# Patient Record
Sex: FEMALE | Race: ASIAN | NOT HISPANIC OR LATINO | URBAN - METROPOLITAN AREA
[De-identification: names, ages, dates, MRNs, and addresses within clinical notes are randomized per-mention and may not be internally consistent; named-entity substitution may affect disease eponyms.]

---

## 2017-12-06 ENCOUNTER — OUTPATIENT (OUTPATIENT)
Dept: OUTPATIENT SERVICES | Facility: HOSPITAL | Age: 52
LOS: 1 days | End: 2017-12-06
Payer: COMMERCIAL

## 2017-12-06 VITALS
SYSTOLIC BLOOD PRESSURE: 130 MMHG | HEIGHT: 63 IN | RESPIRATION RATE: 14 BRPM | DIASTOLIC BLOOD PRESSURE: 92 MMHG | TEMPERATURE: 99 F | HEART RATE: 69 BPM | WEIGHT: 110.01 LBS

## 2017-12-06 DIAGNOSIS — Z01.818 ENCOUNTER FOR OTHER PREPROCEDURAL EXAMINATION: ICD-10-CM

## 2017-12-06 DIAGNOSIS — R87.619 UNSPECIFIED ABNORMAL CYTOLOGICAL FINDINGS IN SPECIMENS FROM CERVIX UTERI: ICD-10-CM

## 2017-12-06 DIAGNOSIS — R87.69 ABNORMAL CYTOLOGICAL FINDINGS IN SPECIMENS FROM OTHER FEMALE GENITAL ORGANS: ICD-10-CM

## 2017-12-06 LAB
HCG UR QL: NEGATIVE — SIGNIFICANT CHANGE UP
HCT VFR BLD CALC: 47.6 % — HIGH (ref 34.5–45)
HGB BLD-MCNC: 15.1 G/DL — SIGNIFICANT CHANGE UP (ref 11.5–15.5)
MCHC RBC-ENTMCNC: 28.7 PG — SIGNIFICANT CHANGE UP (ref 27–34)
MCHC RBC-ENTMCNC: 31.7 GM/DL — LOW (ref 32–36)
MCV RBC AUTO: 90.4 FL — SIGNIFICANT CHANGE UP (ref 80–100)
PLATELET # BLD AUTO: 206 K/UL — SIGNIFICANT CHANGE UP (ref 150–400)
RBC # BLD: 5.26 M/UL — HIGH (ref 3.8–5.2)
RBC # FLD: 12 % — SIGNIFICANT CHANGE UP (ref 10.3–14.5)
WBC # BLD: 3.5 K/UL — LOW (ref 3.8–10.5)
WBC # FLD AUTO: 3.5 K/UL — LOW (ref 3.8–10.5)

## 2017-12-06 PROCEDURE — G0463: CPT

## 2017-12-06 PROCEDURE — 85027 COMPLETE CBC AUTOMATED: CPT

## 2017-12-06 PROCEDURE — 86850 RBC ANTIBODY SCREEN: CPT

## 2017-12-06 PROCEDURE — 86900 BLOOD TYPING SEROLOGIC ABO: CPT

## 2017-12-06 PROCEDURE — 81025 URINE PREGNANCY TEST: CPT

## 2017-12-06 PROCEDURE — 86901 BLOOD TYPING SEROLOGIC RH(D): CPT

## 2017-12-06 NOTE — H&P PST ADULT - ASSESSMENT
52 year old female with Unspecified Abnormal Cytological Findings in Specimens from Cervix and Uteri  - scheduled for Dilation & Curettage  Hysteroscopy with Dr Maurice Ingram on 12/13/17 -

## 2017-12-06 NOTE — H&P PST ADULT - NSANTHOSAYNRD_GEN_A_CORE
No. SAW screening performed.  STOP BANG Legend: 0-2 = LOW Risk; 3-4 = INTERMEDIATE Risk; 5-8 = HIGH Risk

## 2017-12-06 NOTE — H&P PST ADULT - PMH
Abnormal cytological findings in specimens from other female genital organs  Unspecified Abnormal Cytological Findings in Specimens from cervix Uteri

## 2017-12-06 NOTE — H&P PST ADULT - RS GEN PE MLT RESP DETAILS PC
respirations non-labored/breath sounds equal/airway patent/clear to auscultation bilaterally/good air movement

## 2017-12-06 NOTE — H&P PST ADULT - PROBLEM SELECTOR PLAN 1
PST Labs; CBC, Type & Screen, Ucg - No Medical Clearance Needed - pre-op instructions given to pt with understanding verbalized  - Care Notes on Hysteroscopy given to pt for review -

## 2017-12-06 NOTE — H&P PST ADULT - HISTORY OF PRESENT ILLNESS
52 year old female  presents for PST prior to Dilation & Curettage Hysteroscopy with Dr Maurice Ingram on 17 - Pt notes "my pap smear results show something abnormal its been years actually and I get them every 6 months - this time Dr Ingram says its different so he wants to do this procedure." Denies menses for past 2 years - denies any abdominal cramping or other menstrual symptoms - following discussions with Dr Ingram pt is electing for scheduled procedure.

## 2017-12-12 ENCOUNTER — TRANSCRIPTION ENCOUNTER (OUTPATIENT)
Age: 52
End: 2017-12-12

## 2017-12-12 RX ORDER — SODIUM CHLORIDE 9 MG/ML
1000 INJECTION, SOLUTION INTRAVENOUS
Qty: 0 | Refills: 0 | Status: DISCONTINUED | OUTPATIENT
Start: 2017-12-13 | End: 2017-12-13

## 2017-12-12 NOTE — H&P PST ADULT - PMH
Abnormal cytological findings in specimens from other female genital organs  Unspecified Abnormal Cytological Findings in Specimens from cervix Uteri  Atypical glandular cells on cervical Pap smear    Hypercholesterolemia

## 2017-12-12 NOTE — H&P PST ADULT - HISTORY OF PRESENT ILLNESS
51 yo OF, , LMP-   , with atypical glandular cells on a recent pap smear. An endometrial sampling was attempted in the office but was unsuccessful due to cervical stenosis and pt discomfort with dilation. A recent colposcopy was wnl.

## 2017-12-13 ENCOUNTER — OUTPATIENT (OUTPATIENT)
Dept: OUTPATIENT SERVICES | Facility: HOSPITAL | Age: 52
LOS: 1 days | End: 2017-12-13
Payer: COMMERCIAL

## 2017-12-13 ENCOUNTER — RESULT REVIEW (OUTPATIENT)
Age: 52
End: 2017-12-13

## 2017-12-13 VITALS
HEART RATE: 60 BPM | RESPIRATION RATE: 14 BRPM | OXYGEN SATURATION: 98 % | HEIGHT: 63 IN | DIASTOLIC BLOOD PRESSURE: 78 MMHG | TEMPERATURE: 98 F | SYSTOLIC BLOOD PRESSURE: 116 MMHG | WEIGHT: 110.01 LBS

## 2017-12-13 VITALS
OXYGEN SATURATION: 99 % | RESPIRATION RATE: 16 BRPM | DIASTOLIC BLOOD PRESSURE: 74 MMHG | SYSTOLIC BLOOD PRESSURE: 120 MMHG | HEART RATE: 65 BPM

## 2017-12-13 DIAGNOSIS — R87.619 UNSPECIFIED ABNORMAL CYTOLOGICAL FINDINGS IN SPECIMENS FROM CERVIX UTERI: ICD-10-CM

## 2017-12-13 DIAGNOSIS — Z98.890 OTHER SPECIFIED POSTPROCEDURAL STATES: Chronic | ICD-10-CM

## 2017-12-13 PROCEDURE — 88305 TISSUE EXAM BY PATHOLOGIST: CPT

## 2017-12-13 PROCEDURE — 88305 TISSUE EXAM BY PATHOLOGIST: CPT | Mod: 26

## 2017-12-13 PROCEDURE — 58558 HYSTEROSCOPY BIOPSY: CPT

## 2017-12-13 RX ORDER — SODIUM CHLORIDE 9 MG/ML
1000 INJECTION, SOLUTION INTRAVENOUS
Qty: 0 | Refills: 0 | Status: DISCONTINUED | OUTPATIENT
Start: 2017-12-13 | End: 2017-12-13

## 2017-12-13 RX ORDER — METOCLOPRAMIDE HCL 10 MG
10 TABLET ORAL ONCE
Qty: 0 | Refills: 0 | Status: DISCONTINUED | OUTPATIENT
Start: 2017-12-13 | End: 2017-12-13

## 2017-12-13 RX ORDER — OXYCODONE AND ACETAMINOPHEN 5; 325 MG/1; MG/1
1 TABLET ORAL EVERY 4 HOURS
Qty: 0 | Refills: 0 | Status: DISCONTINUED | OUTPATIENT
Start: 2017-12-13 | End: 2017-12-13

## 2017-12-13 RX ORDER — MEPERIDINE HYDROCHLORIDE 50 MG/ML
12.5 INJECTION INTRAMUSCULAR; INTRAVENOUS; SUBCUTANEOUS
Qty: 0 | Refills: 0 | Status: DISCONTINUED | OUTPATIENT
Start: 2017-12-13 | End: 2017-12-13

## 2017-12-13 RX ORDER — ONDANSETRON 8 MG/1
4 TABLET, FILM COATED ORAL ONCE
Qty: 0 | Refills: 0 | Status: DISCONTINUED | OUTPATIENT
Start: 2017-12-13 | End: 2017-12-13

## 2017-12-13 RX ORDER — DIPHENHYDRAMINE HCL 50 MG
12.5 CAPSULE ORAL ONCE
Qty: 0 | Refills: 0 | Status: COMPLETED | OUTPATIENT
Start: 2017-12-13 | End: 2017-12-13

## 2017-12-13 RX ORDER — HYDROMORPHONE HYDROCHLORIDE 2 MG/ML
0.5 INJECTION INTRAMUSCULAR; INTRAVENOUS; SUBCUTANEOUS
Qty: 0 | Refills: 0 | Status: DISCONTINUED | OUTPATIENT
Start: 2017-12-13 | End: 2017-12-13

## 2017-12-13 RX ADMIN — Medication 12.5 MILLIGRAM(S): at 10:15

## 2017-12-13 RX ADMIN — SODIUM CHLORIDE 75 MILLILITER(S): 9 INJECTION, SOLUTION INTRAVENOUS at 08:35

## 2017-12-13 RX ADMIN — SODIUM CHLORIDE 75 MILLILITER(S): 9 INJECTION, SOLUTION INTRAVENOUS at 06:42

## 2017-12-13 NOTE — ASU DISCHARGE PLAN (ADULT/PEDIATRIC). - NOTIFY
Bleeding that does not stop/Pain not relieved by Medications/GYN Fever>100.4/Inability to Tolerate Liquids or Foods

## 2017-12-13 NOTE — BRIEF OPERATIVE NOTE - PROCEDURE
<<-----Click on this checkbox to enter Procedure Hysteroscopy with dilation and curettage of uterus  12/13/2017    Active  [facility]

## 2017-12-14 LAB — SURGICAL PATHOLOGY FINAL REPORT - CH: SIGNIFICANT CHANGE UP

## 2019-10-01 PROBLEM — R87.69 ABNORMAL CYTOLOGICAL FINDINGS IN SPECIMENS FROM OTHER FEMALE GENITAL ORGANS: Chronic | Status: ACTIVE | Noted: 2017-12-06

## 2019-10-01 PROBLEM — E78.00 PURE HYPERCHOLESTEROLEMIA, UNSPECIFIED: Chronic | Status: ACTIVE | Noted: 2017-12-12

## 2019-10-01 PROBLEM — R87.619 UNSPECIFIED ABNORMAL CYTOLOGICAL FINDINGS IN SPECIMENS FROM CERVIX UTERI: Chronic | Status: ACTIVE | Noted: 2017-12-12

## 2019-10-07 PROBLEM — Z00.00 ENCOUNTER FOR PREVENTIVE HEALTH EXAMINATION: Status: ACTIVE | Noted: 2019-10-07

## 2019-10-10 ENCOUNTER — APPOINTMENT (OUTPATIENT)
Dept: COLORECTAL SURGERY | Facility: CLINIC | Age: 54
End: 2019-10-10

## 2019-12-05 ENCOUNTER — TRANSCRIPTION ENCOUNTER (OUTPATIENT)
Age: 54
End: 2019-12-05

## 2019-12-06 ENCOUNTER — RESULT REVIEW (OUTPATIENT)
Age: 54
End: 2019-12-06

## 2019-12-06 ENCOUNTER — OUTPATIENT (OUTPATIENT)
Dept: OUTPATIENT SERVICES | Facility: HOSPITAL | Age: 54
LOS: 1 days | End: 2019-12-06
Payer: COMMERCIAL

## 2019-12-06 DIAGNOSIS — Z12.11 ENCOUNTER FOR SCREENING FOR MALIGNANT NEOPLASM OF COLON: ICD-10-CM

## 2019-12-06 DIAGNOSIS — Z98.890 OTHER SPECIFIED POSTPROCEDURAL STATES: Chronic | ICD-10-CM

## 2019-12-06 LAB — HCG UR QL: NEGATIVE — SIGNIFICANT CHANGE UP

## 2019-12-06 PROCEDURE — 88305 TISSUE EXAM BY PATHOLOGIST: CPT

## 2019-12-06 PROCEDURE — 45380 COLONOSCOPY AND BIOPSY: CPT | Mod: PT

## 2019-12-06 PROCEDURE — 81025 URINE PREGNANCY TEST: CPT

## 2019-12-06 PROCEDURE — 88305 TISSUE EXAM BY PATHOLOGIST: CPT | Mod: 26

## 2019-12-09 LAB — SURGICAL PATHOLOGY STUDY: SIGNIFICANT CHANGE UP

## 2021-03-03 ENCOUNTER — TRANSCRIPTION ENCOUNTER (OUTPATIENT)
Age: 56
End: 2021-03-03

## 2025-03-07 NOTE — ASU PREOP CHECKLIST - ASSESSMENT, HISTORY & PHYSICAL COMPLETED AND ON MEDICAL RECORD
"Oncology Follow-Up    Susy Gomez  [id number]      Breast         AJCC Edition: 7th (AJCC), Diagnosis Date: 18-May-2015, IIIC, T2 N3b M0 G3      Treatment History:    1. Breast imaging 5/19/15 showed 1. Highly suspicious left breast mass measuring 3.9 cm by U/S. 2. Single unremarkable left axillary lymph node. 3. No  evidence of malignancy in the right breast.     2. Status post core biopsy left breast 5/19/15 revealing grade 3 poorly-differentiated invasive ductal carcinoma. ER > 90%, IN negative and HER2 3+ by IHC.     2. There was a single abnormal axillary LN on U/S. Biopsy 5/26/15 positive for malignancy. Consult with Dr. Chong who found a 4 cm left breast mass, no palpable adenopathy.    3. PET/CT staging negative for distant metastases. The breast mass was FDG avid as were several axillary LNs. \"Several other small mildly hypermetabolic axillary nodes are also noted. Note is made of focal FDG activity seen within the left internal mammary  chain of lymph nodes as well, concerning for metastatic involvement.\"    4. Mediport placed 6/24/15. Radiation consult competed 7/7/15. IM nodes interpreted as positive. Clinical Stage [id number], Clinical Stage IIIC    5. Chemotherapy with Docetaxel/Carboplatin/Trastuzumab/Pertuzumab started 6/25/15, completed 10/16/15. Trastuzumab to continue every three weeks.      6. Bilateral mastectomy, right prophylactic and benign 12/4/15. Left breast pathology showed 0.25cm of invasive ductal carcinoma with clear margins and 0/4 SLNs. [id number].     7. Radiation therapy to the left romina regions and chest wall with PET + internal mammary node included. Radiation therapy given 1/27/16 through 3/14/16.      8. Anastrozole started 3/28/16. Last trastuzumab 6/3/16.     9. F/U on small pulmonary nodules indicated for at least 2 years on any finding (minimum 4/2018 with new nodule 4/2016.      Subjective    Susy presents for her Oncology Follow Up Visit. She continues to have discomfort in " "her mastectomy sight. She asks about switching back to Prolia as she had less side effects. She continues on anastrozole and asks for a refill. Susy's father passed away a few months ago. They were very close and it is very hard for her. Susy's daughter was  this past summer. She showed me pictures of her, her daughter, , and son-in-law. Susy denies any unusual headaches, balance issues, depression, cough, shortness of breath, problems swallowing, changes in chest/breast area, abdominal pain, bone or muscle pain, vaginal bleeding, rectal bleeding, blood in the urine, vaginal dryness, swelling arms or legs, new or unusual skin moles or lesions.           Objective      Vitals:    03/06/25 1532   BP: 114/66   Pulse: 66   Temp: 36.7 °C (98 °F)   SpO2: 100%        Constitutional: Well developed, alert/oriented x3, no distress, cooperative   Eyes: clear sclera   ENMT: mucous membranes moist, no apparent lesions   Head/Neck: Neck supple, no bruits   Respiratory/Thorax: Patent airways, normal breath sounds with good chest expansion   Cardiovascular: Regular rate and rhythm, no murmurs, 2+ equal pulses of the extremities,   Gastrointestinal: Nondistended, soft, non-tender, no masses palpable, no organomegaly   Musculoskeletal: ROM intact, no joint swelling, normal strength   Extremities: normal extremities, no edema, cyanosis, contusions or wounds   Neurological: alert and oriented x3,  normal strength   Breast:  bilateral mastectomy with well healed incisions and well healed left axillary incision; no masses, nodules, skin changes, discharge    Lymphatic: No significant lymphadenopathy   Psychological: Appropriate mood and behavior   Skin: Warm and dry, no lesions, no rashes      Physical Exam     No results found for: \"WBC\", \"HGB\", \"HCT\", \"MCV\", \"PLT\"    Chemistry    Lab Results   Component Value Date/Time     09/15/2023 1652    K 3.7 09/15/2023 1652     09/15/2023 1652    CO2 27 09/15/2023 " 1652    BUN 16 09/15/2023 1652    CREATININE 0.59 09/15/2023 1652    Lab Results   Component Value Date/Time    CALCIUM 9.7 09/15/2023 1652    ALKPHOS 54 09/15/2023 1652    AST 21 09/15/2023 1652    ALT 25 09/15/2023 1652    BILITOT 0.6 09/15/2023 1652         Imaging:  Signed Time Phone Pager   Bharati Wei MD 10/03/2024 10:22 [phone]     Exam Information    Status Exam Begun Exam Ended   Final 10/02/2024 14:03 10/02/2024 14:31     Study Result    Narrative & Impression   Interpreted By:  Bharati Wei,   STUDY:  DEXA BONE [id number]/2/2024 2:31 pm      INDICATION:  Signs/Symptoms:64-year-old menopausal female on Arimidex and Zometa,  last bone density with osteopenia in June 2022.. The patient is a 63 y/o  year old F.      ,M85.852 Other specified disorders of bone density and structure,  left thigh      COMPARISON:  None.      ACCESSION NUMBER(S):  [id number]      ORDERING CLINICIAN:  ELMER KWON      TECHNIQUE:  DEXA BONE DENSITY      FINDINGS:  SPINE L1-L4  Bone Mineral Density: 0.971  T-Score -1.7  Z-Score -0.4  Bone Mineral Density change vs baseline:  Not reported  Bone Mineral Density change vs previous: Not reported      LEFT FEMUR -TOTAL  Bone Mineral Density: 0.947  T-Score -0.5   Z-Score  0.6  Bone Mineral Density change vs baseline: Not reported  Bone Mineral Density change vs previous: Not reported      LEFT FEMUR -NECK  Bone Mineral Density: 0.846  T-Score -1.4  Z-Score 0.0  Bone Mineral Density change vs baseline: Not reported  Bone Mineral Density change vs previous: Not reported          World Health Organization (WHO) criteria for post-menopausal,   Women:  Normal:         T-score at or above -1 SD  Osteopenia:   T-score between -1 and -2.5 SD  Osteoporosis: T-score at or below -2.5 SD          10-year Fracture Risk:  Major Osteoporotic Fracture  8.2  Hip Fracture                        0.7      Note:  If no FRAX score is reported, it is because:  Some T-score for  Spine Total or Hip Total or Femoral Neck at or below  -2.5      This exam was performed at [facility] on a GE Lunar  Prodig[employer] Advance Dexa Unit.      IMPRESSION:  DEXA:  According to World Health Organization criteria,  classification is low bone mass (osteopenia)     Assessment/Plan    Susy is a 63 yo woman with a history of T2N3 triple positive left IDC G-3 diagnosed may 2015. She is s/p bilateral mastectomy, TCHP, XRT, and has been on an extended course of anastrozole with good tolerance. There is no evidence of recurrent disease on today's exam.     Plan:  Exam is negative.  Continue anastrozole 1mg daily. Her 10 year course will run through March 2026.   Okay to switch to to Prolia. She will have her Zometa infusion today.  We reviewed her bone density results showing a osteopenia in her spine and left femur neck. Her left femur total is within normal limits.   Encouraged monthly breast self exams, plant based diet, keep alcohol <3 drinks/week, exercise at least 2.5 hours/week.  We reviewed signs/symptoms of recurrence including new masses, new pigmented lesion, tugging or pulling of the skin, nipple discharge, rash in or around the chest area, or any new finding that doesn't resolve within a 2-3 weeks.  All of Susy's questions/concerns were addressed.  Over 30 minutes of time was spent with this patient with >50% of the time with education, counseling, and coordination of care.   Susy prefers to be seen twice yearly. I will see her back with her Prolia injection in late August. She will call with any concerns prior to that time.    Diagnoses and all orders for this visit:  Encounter for monitoring aromatase inhibitor therapy  Carcinoma of left breast metastatic to axillary lymph node (Multi)  -     Clinic Appointment Request Follow Up; [facility]  -     Clinic Appointment Request Follow Up; Future  Encounter for monitoring zoledronic acid therapy  Oncology follow-up encounter  History of  bilateral mastectomy  History of cancer chemotherapy  History of external beam radiation therapy        Gini Yarbrough, APRN-CNP      12/12/done